# Patient Record
Sex: MALE | Race: WHITE | NOT HISPANIC OR LATINO | URBAN - METROPOLITAN AREA
[De-identification: names, ages, dates, MRNs, and addresses within clinical notes are randomized per-mention and may not be internally consistent; named-entity substitution may affect disease eponyms.]

---

## 2020-08-26 ENCOUNTER — EMERGENCY (EMERGENCY)
Facility: HOSPITAL | Age: 25
LOS: 1 days | Discharge: HOME | End: 2020-08-26
Attending: EMERGENCY MEDICINE
Payer: MEDICAID

## 2020-08-26 VITALS
DIASTOLIC BLOOD PRESSURE: 64 MMHG | SYSTOLIC BLOOD PRESSURE: 104 MMHG | HEART RATE: 74 BPM | RESPIRATION RATE: 18 BRPM | OXYGEN SATURATION: 100 % | TEMPERATURE: 99 F

## 2020-08-26 VITALS
DIASTOLIC BLOOD PRESSURE: 71 MMHG | OXYGEN SATURATION: 100 % | SYSTOLIC BLOOD PRESSURE: 119 MMHG | TEMPERATURE: 99 F | RESPIRATION RATE: 20 BRPM | HEART RATE: 94 BPM

## 2020-08-26 LAB
ALBUMIN SERPL ELPH-MCNC: 4.1 G/DL — SIGNIFICANT CHANGE UP (ref 3.3–5.2)
ALP SERPL-CCNC: 93 U/L — SIGNIFICANT CHANGE UP (ref 40–120)
ALT FLD-CCNC: 19 U/L — SIGNIFICANT CHANGE UP
ANION GAP SERPL CALC-SCNC: 10 MMOL/L — SIGNIFICANT CHANGE UP (ref 5–17)
APPEARANCE UR: ABNORMAL
AST SERPL-CCNC: 28 U/L — SIGNIFICANT CHANGE UP
BACTERIA # UR AUTO: ABNORMAL
BASOPHILS # BLD AUTO: 0.08 K/UL — SIGNIFICANT CHANGE UP (ref 0–0.2)
BASOPHILS NFR BLD AUTO: 0.9 % — SIGNIFICANT CHANGE UP (ref 0–2)
BILIRUB SERPL-MCNC: 0.3 MG/DL — LOW (ref 0.4–2)
BILIRUB UR-MCNC: NEGATIVE — SIGNIFICANT CHANGE UP
BUN SERPL-MCNC: 6 MG/DL — LOW (ref 8–20)
CALCIUM SERPL-MCNC: 9.3 MG/DL — SIGNIFICANT CHANGE UP (ref 8.6–10.2)
CHLORIDE SERPL-SCNC: 105 MMOL/L — SIGNIFICANT CHANGE UP (ref 98–107)
CO2 SERPL-SCNC: 23 MMOL/L — SIGNIFICANT CHANGE UP (ref 22–29)
COLOR SPEC: YELLOW — SIGNIFICANT CHANGE UP
COMMENT - URINE: SIGNIFICANT CHANGE UP
CREAT SERPL-MCNC: 0.86 MG/DL — SIGNIFICANT CHANGE UP (ref 0.5–1.3)
DIFF PNL FLD: NEGATIVE — SIGNIFICANT CHANGE UP
EOSINOPHIL # BLD AUTO: 0.12 K/UL — SIGNIFICANT CHANGE UP (ref 0–0.5)
EOSINOPHIL NFR BLD AUTO: 1.4 % — SIGNIFICANT CHANGE UP (ref 0–6)
EPI CELLS # UR: SIGNIFICANT CHANGE UP
ERYTHROCYTE [SEDIMENTATION RATE] IN BLOOD: 2 MM/HR — SIGNIFICANT CHANGE UP (ref 0–20)
GLUCOSE SERPL-MCNC: 84 MG/DL — SIGNIFICANT CHANGE UP (ref 70–99)
GLUCOSE UR QL: NEGATIVE MG/DL — SIGNIFICANT CHANGE UP
HCT VFR BLD CALC: 40 % — SIGNIFICANT CHANGE UP (ref 39–50)
HGB BLD-MCNC: 13.9 G/DL — SIGNIFICANT CHANGE UP (ref 13–17)
IMM GRANULOCYTES NFR BLD AUTO: 0.2 % — SIGNIFICANT CHANGE UP (ref 0–1.5)
KETONES UR-MCNC: NEGATIVE — SIGNIFICANT CHANGE UP
LEUKOCYTE ESTERASE UR-ACNC: ABNORMAL
LYMPHOCYTES # BLD AUTO: 1.55 K/UL — SIGNIFICANT CHANGE UP (ref 1–3.3)
LYMPHOCYTES # BLD AUTO: 18.3 % — SIGNIFICANT CHANGE UP (ref 13–44)
MCHC RBC-ENTMCNC: 33.6 PG — SIGNIFICANT CHANGE UP (ref 27–34)
MCHC RBC-ENTMCNC: 34.8 GM/DL — SIGNIFICANT CHANGE UP (ref 32–36)
MCV RBC AUTO: 96.6 FL — SIGNIFICANT CHANGE UP (ref 80–100)
MONOCYTES # BLD AUTO: 0.69 K/UL — SIGNIFICANT CHANGE UP (ref 0–0.9)
MONOCYTES NFR BLD AUTO: 8.1 % — SIGNIFICANT CHANGE UP (ref 2–14)
NEUTROPHILS # BLD AUTO: 6.01 K/UL — SIGNIFICANT CHANGE UP (ref 1.8–7.4)
NEUTROPHILS NFR BLD AUTO: 71.1 % — SIGNIFICANT CHANGE UP (ref 43–77)
NITRITE UR-MCNC: NEGATIVE — SIGNIFICANT CHANGE UP
PCP SPEC-MCNC: SIGNIFICANT CHANGE UP
PCP SPEC-MCNC: SIGNIFICANT CHANGE UP
PH UR: 8 — SIGNIFICANT CHANGE UP (ref 5–8)
PLATELET # BLD AUTO: 397 K/UL — SIGNIFICANT CHANGE UP (ref 150–400)
POTASSIUM SERPL-MCNC: 3.9 MMOL/L — SIGNIFICANT CHANGE UP (ref 3.5–5.3)
POTASSIUM SERPL-SCNC: 3.9 MMOL/L — SIGNIFICANT CHANGE UP (ref 3.5–5.3)
PROT SERPL-MCNC: 6.5 G/DL — LOW (ref 6.6–8.7)
PROT UR-MCNC: 15 MG/DL
RBC # BLD: 4.14 M/UL — LOW (ref 4.2–5.8)
RBC # FLD: 12.5 % — SIGNIFICANT CHANGE UP (ref 10.3–14.5)
RBC CASTS # UR COMP ASSIST: NEGATIVE /HPF — SIGNIFICANT CHANGE UP (ref 0–4)
SODIUM SERPL-SCNC: 138 MMOL/L — SIGNIFICANT CHANGE UP (ref 135–145)
SP GR SPEC: 1.01 — SIGNIFICANT CHANGE UP (ref 1.01–1.02)
UROBILINOGEN FLD QL: NEGATIVE MG/DL — SIGNIFICANT CHANGE UP
WBC # BLD: 8.47 K/UL — SIGNIFICANT CHANGE UP (ref 3.8–10.5)
WBC # FLD AUTO: 8.47 K/UL — SIGNIFICANT CHANGE UP (ref 3.8–10.5)
WBC UR QL: SIGNIFICANT CHANGE UP

## 2020-08-26 PROCEDURE — 80307 DRUG TEST PRSMV CHEM ANLYZR: CPT

## 2020-08-26 PROCEDURE — 93010 ELECTROCARDIOGRAM REPORT: CPT

## 2020-08-26 PROCEDURE — 81001 URINALYSIS AUTO W/SCOPE: CPT

## 2020-08-26 PROCEDURE — 85027 COMPLETE CBC AUTOMATED: CPT

## 2020-08-26 PROCEDURE — 96374 THER/PROPH/DIAG INJ IV PUSH: CPT | Mod: XU

## 2020-08-26 PROCEDURE — 99284 EMERGENCY DEPT VISIT MOD MDM: CPT | Mod: 25

## 2020-08-26 PROCEDURE — 93005 ELECTROCARDIOGRAM TRACING: CPT

## 2020-08-26 PROCEDURE — 70496 CT ANGIOGRAPHY HEAD: CPT

## 2020-08-26 PROCEDURE — 99244 OFF/OP CNSLTJ NEW/EST MOD 40: CPT

## 2020-08-26 PROCEDURE — G0378: CPT

## 2020-08-26 PROCEDURE — 70498 CT ANGIOGRAPHY NECK: CPT

## 2020-08-26 PROCEDURE — 36415 COLL VENOUS BLD VENIPUNCTURE: CPT

## 2020-08-26 PROCEDURE — 70551 MRI BRAIN STEM W/O DYE: CPT

## 2020-08-26 PROCEDURE — 99220: CPT

## 2020-08-26 PROCEDURE — 80053 COMPREHEN METABOLIC PANEL: CPT

## 2020-08-26 PROCEDURE — 70496 CT ANGIOGRAPHY HEAD: CPT | Mod: 26

## 2020-08-26 PROCEDURE — 70551 MRI BRAIN STEM W/O DYE: CPT | Mod: 26

## 2020-08-26 PROCEDURE — 70498 CT ANGIOGRAPHY NECK: CPT | Mod: 26

## 2020-08-26 PROCEDURE — 85652 RBC SED RATE AUTOMATED: CPT

## 2020-08-26 RX ORDER — METOCLOPRAMIDE HCL 10 MG
10 TABLET ORAL ONCE
Refills: 0 | Status: COMPLETED | OUTPATIENT
Start: 2020-08-26 | End: 2020-08-26

## 2020-08-26 RX ORDER — ACETAMINOPHEN 500 MG
975 TABLET ORAL ONCE
Refills: 0 | Status: DISCONTINUED | OUTPATIENT
Start: 2020-08-26 | End: 2020-08-31

## 2020-08-26 RX ADMIN — Medication 10 MILLIGRAM(S): at 12:30

## 2020-08-26 NOTE — ED ADULT NURSE NOTE - NSIMPLEMENTINTERV_GEN_ALL_ED
Implemented All Universal Safety Interventions:  Madeline to call system. Call bell, personal items and telephone within reach. Instruct patient to call for assistance. Room bathroom lighting operational. Non-slip footwear when patient is off stretcher. Physically safe environment: no spills, clutter or unnecessary equipment. Stretcher in lowest position, wheels locked, appropriate side rails in place.

## 2020-08-26 NOTE — ED PROVIDER NOTE - PHYSICAL EXAMINATION
Gen: Well appearing in NAD  Head: NC/AT  Neck: trachea midline  Resp:  No distress, CTAB  CV: RRR  GI: soft, NTND  Ext: no deformities  Neuro:  Diplopia with bilateral eyes, no diplopia/blurry vision with individual eyes. Motor and sensation normal. No dysmetria. Normal gait. NIHSS 0  Skin:  Warm and dry as visualized  Psych:  Normal affect and mood

## 2020-08-26 NOTE — ED CDU PROVIDER INITIAL DAY NOTE - PROGRESS NOTE DETAILS
CTA head and neck nl, no aneurysm. Spoke with Dr. Ramirez (Neuro IR) regarding results- states no need for intervention from Neuro IR standpoint.

## 2020-08-26 NOTE — CONSULT NOTE ADULT - SUBJECTIVE AND OBJECTIVE BOX
26 y/o male, PMH seizure disorder, reports breakthrough seizure on Saturday seizure witnessed by dad who heard noise in the bedroom. Admits noncompliance with seizure and headache meds. Yesterday, had an episode of double vision while at work, lasted a few hours and associated with headache, nausea, dizziness. States he went to [facility] ED and was later discharged. States 15 minutes after he returned home on Tuesday night, he had double vision again, he went to lay down in bed and it resolved. Then he woke up this am and had a third episode which lasted about an hour, his mother brought him to [facility] ED for further evaluation. Patient states that symptoms are more severe for objects that are further away. States double vision is alleviated by covering either eye. Patient states he had two episodes approximately one year ago. Patient reports he has never had an eye exam and has never worn corrective lenses.   MRI obtained in ED showed possible aneurysm adjacent to the left ophthalmic/supraclinoid ICA. Patient was placed in obs and Neurointerventional Surgery was consulted. A CTA head/neck was requested. Resulted as negative for acute findings.         PAST MEDICAL & SURGICAL HISTORY:  Headache  Seizure  No significant past surgical history    FAMILY HISTORY:  No pertinent family history in first degree relatives      SOCIAL HISTORY:  Tobacco Use: 20 pack years- states he quit last week   EtOH use: no  Substance: smoked marijuana states he quit 3 weeks ago  Allergies: none reported      REVIEW OF SYSTEMS  CONSTITUTIONAL: No fever, weight loss, or fatigue  EYES: + double vision associated w nausea/dizziness. No eye pain or discharge  ENMT:  No difficulty hearing, tinnitus, vertigo; No sinus or throat pain  NECK: No pain or stiffness  BREASTS: No pain, masses, or nipple discharge  RESPIRATORY: No cough, wheezing, chills or hemoptysis; No shortness of breath  CARDIOVASCULAR: No chest pain, palpitations, dizziness, or leg swelling  GASTROINTESTINAL: No abdominal or epigastric pain. No nausea, vomiting, or hematemesis; No diarrhea or constipation. No melena or hematochezia.  GENITOURINARY: No dysuria, frequency, hematuria, or incontinence  NEUROLOGICAL: No headaches, memory loss, loss of strength, numbness, or tremors  SKIN: No itching, burning, rashes, or lesions   LYMPH NODES: No enlarged glands  ENDOCRINE: No heat or cold intolerance; No hair loss  MUSCULOSKELETAL: No joint pain or swelling; No muscle, back, or extremity pain  PSYCHIATRIC: No depression, anxiety, mood swings, or difficulty sleeping  HEME/LYMPH: No easy bruising, or bleeding gums  ALLERY AND IMMUNOLOGIC: No hives or eczema    HOME MEDICATIONS:  Home Medications: Trileptal 600 mg bid    Neuro:  acetaminophen   Tablet .. 975 milliGRAM(s) Oral once      Vital Signs Last 24 Hrs  T(C): 37.1 (26 Aug 2020 13:25), Max: 37.1 (26 Aug 2020 13:25)  T(F): 98.7 (26 Aug 2020 13:25), Max: 98.7 (26 Aug 2020 13:25)  HR: 74 (26 Aug 2020 13:25) (74 - 94)  BP: 104/64 (26 Aug 2020 13:25) (104/64 - 131/82)  BP(mean): 99 (26 Aug 2020 12:30) (99 - 99)  RR: 18 (26 Aug 2020 13:25) (18 - 20)  SpO2: 100% (26 Aug 2020 13:25) (100% - 100%)      General: NAD, sitting up in bed, well groomed in nightgown  Eyes: PERRLA, EOM intact  Neck: Supple and symmetrical, no JVD  CV: RRR, no m/r/g  Lungs: CTA b/l, no use of accessory muscles, no wheezes, rales, rhonchi  Skin: warm, dry, no rashes  Psych: normal mood/affect  Abdomen: Normal BS, soft, NT/ND  Extremities: no edema, cyanosis  Musculoskeletal: good strength b/l UE/LE, normal ROM, no joint swelling  Neuro: A & O X 4, unable to reproduce symptoms of double vision at this time, CN intact, PERRLA, EOM intact, facial symmetry intact, clear speech, motor 5/5 all extremities, no sensory deficit       LABS:                        13.9   8.47  )-----------( 397      ( 26 Aug 2020 09:52 )             40.0 < from: CT Angio Head w/ IV Cont (20 @ 16:35) >          138  |  105  |  6.0<L>  ----------------------------<  84  3.9   |  23.0  |  0.86    Ca    9.3      26 Aug 2020 09:52    TPro  6.5<L>  /  Alb  4.1  /  TBili  0.3<L>  /  DBili  x   /  AST  28  /  ALT  19  /  AlkPhos  93        Urinalysis Basic - ( 26 Aug 2020 10:19 )    Color: Yellow / Appearance: Slightly Turbid / S.010 / pH: x  Gluc: x / Ketone: Negative  / Bili: Negative / Urobili: Negative mg/dL   Blood: x / Protein: 15 mg/dL / Nitrite: Negative   Leuk Esterase: Trace / RBC: Negative /HPF / WBC 0-2   Sq Epi: x / Non Sq Epi: Occasional / Bacteria: Few        RADIOLOGY & ADDITIONAL STUDIES:    IMPRESSION:    CT BRAIN:  No acute findings on noncontrast CT of the brain. CTA NECK:  No significant stenosis of the cervical carotid or vertebral arteries.    CTA HEAD: No high-grade stenosis or occlusion of the major proximal arterial branches.      < end of copied text >    < from: MR Head No Cont (20 @ 10:52) >    IMPRESSION:    There is no acute infarction, acute hemorrhage, cerebral edema, or intracranial mass effect.    Ovoid flow-void adjacent to the left ophthalmic/supraclinoid ICA may represent an aneurysm. Correlation with an MRA of the brain is recommended for further characterization.    < end of copied text > 24 y/o male, PMH seizure disorder, reports breakthrough seizure on Saturday seizure witnessed by dad who heard noise in the bedroom. Admits noncompliance with seizure and headache meds. Yesterday, had an episode of double vision while at work, lasted a few hours and associated with headache, nausea, dizziness. States he went to [facility] ED and was later discharged. States 15 minutes after he returned home on Tuesday night, he had double vision again, he went to lay down in bed and it resolved. Then he woke up this am and had a third episode which lasted about an hour, his mother brought him to [facility] ED for further evaluation. Patient states that symptoms are more severe for objects that are further away. States double vision is alleviated by covering either eye. Patient states he had two episodes approximately one year ago. Patient reports he has never had an eye exam and has never worn corrective lenses.   MRI obtained in ED showed possible aneurysm adjacent to the left ophthalmic/supraclinoid ICA. Patient was placed in obs and Neurointerventional Surgery was consulted. A CTA head/neck was requested. Resulted as negative for acute findings.         PAST MEDICAL & SURGICAL HISTORY:  Headache  Seizure  No significant past surgical history    FAMILY HISTORY:  No pertinent family history in first degree relatives      SOCIAL HISTORY:  Tobacco Use: 20 pack years- states he quit last week   EtOH use: no  Substance: smoked marijuana states he quit 3 weeks ago  Allergies: none reported      REVIEW OF SYSTEMS  CONSTITUTIONAL: No fever, weight loss, or fatigue  EYES: + double vision associated w nausea/dizziness. No eye pain or discharge  ENMT:  No difficulty hearing, tinnitus, vertigo; No sinus or throat pain  NECK: No pain or stiffness  BREASTS: No pain, masses, or nipple discharge  RESPIRATORY: No cough, wheezing, chills or hemoptysis; No shortness of breath  CARDIOVASCULAR: No chest pain, palpitations, dizziness, or leg swelling  GASTROINTESTINAL: No abdominal or epigastric pain. No nausea, vomiting, or hematemesis; No diarrhea or constipation. No melena or hematochezia.  GENITOURINARY: No dysuria, frequency, hematuria, or incontinence  NEUROLOGICAL: No headaches, memory loss, loss of strength, numbness, or tremors  SKIN: No itching, burning, rashes, or lesions   LYMPH NODES: No enlarged glands  ENDOCRINE: No heat or cold intolerance; No hair loss  MUSCULOSKELETAL: No joint pain or swelling; No muscle, back, or extremity pain  PSYCHIATRIC: No depression, anxiety, mood swings, or difficulty sleeping  HEME/LYMPH: No easy bruising, or bleeding gums  ALLERY AND IMMUNOLOGIC: No hives or eczema    HOME MEDICATIONS:  Home Medications: Trileptal 600 mg bid    Neuro:  acetaminophen   Tablet .. 975 milliGRAM(s) Oral once      Vital Signs Last 24 Hrs  T(C): 37.1 (26 Aug 2020 13:25), Max: 37.1 (26 Aug 2020 13:25)  T(F): 98.7 (26 Aug 2020 13:25), Max: 98.7 (26 Aug 2020 13:25)  HR: 74 (26 Aug 2020 13:25) (74 - 94)  BP: 104/64 (26 Aug 2020 13:25) (104/64 - 131/82)  BP(mean): 99 (26 Aug 2020 12:30) (99 - 99)  RR: 18 (26 Aug 2020 13:25) (18 - 20)  SpO2: 100% (26 Aug 2020 13:25) (100% - 100%)      General: NAD, sitting up in bed, well groomed in nightgown  Eyes: PERRLA, EOM intact  Neck: Supple and symmetrical, no JVD  CV: RRR, no m/r/g  Lungs: CTA b/l, no use of accessory muscles, no wheezes, rales, rhonchi  Skin: warm, dry, no rashes  Psych: normal mood/affect  Abdomen: Normal BS, soft, NT/ND  Extremities: no edema, cyanosis  Musculoskeletal: good strength b/l UE/LE, normal ROM, no joint swelling  Neuro: A & O X 4, unable to reproduce symptoms of double vision at this time, CN intact, PERRLA, EOM intact, facial symmetry intact, clear speech, motor 5/5 all extremities, no sensory deficit       LABS:                        13.9   8.47  )-----------( 397      ( 26 Aug 2020 09:52 )             40.0   08-26    138  |  105  |  6.0<L>  ----------------------------<  84  3.9   |  23.0  |  0.86    Ca    9.3      26 Aug 2020 09:52    TPro  6.5<L>  /  Alb  4.1  /  TBili  0.3<L>  /  DBili  x   /  AST  28  /  ALT  19  /  AlkPhos  93        Urinalysis Basic - ( 26 Aug 2020 10:19 )    Color: Yellow / Appearance: Slightly Turbid / S.010 / pH: x  Gluc: x / Ketone: Negative  / Bili: Negative / Urobili: Negative mg/dL   Blood: x / Protein: 15 mg/dL / Nitrite: Negative   Leuk Esterase: Trace / RBC: Negative /HPF / WBC 0-2   Sq Epi: x / Non Sq Epi: Occasional / Bacteria: Few        RADIOLOGY & ADDITIONAL STUDIES:    CT Angio Head w/ IV Cont (20 @ 16:35)       IMPRESSION:    CT BRAIN:  No acute findings on noncontrast CT of the brain. CTA NECK:  No significant stenosis of the cervical carotid or vertebral arteries.    CTA HEAD: No high-grade stenosis or occlusion of the major proximal arterial branches.      MR Head No Cont (20 @ 10:52)     IMPRESSION:    There is no acute infarction, acute hemorrhage, cerebral edema, or intracranial mass effect.    Ovoid flow-void adjacent to the left ophthalmic/supraclinoid ICA may represent an aneurysm. Correlation with an MRA of the brain is recommended for further characterization.

## 2020-08-26 NOTE — CONSULT NOTE ADULT - ATTENDING COMMENTS
Impression: Intermittent episodes of diplopia as described seemed central in origin in view of negative MR TIA is among differential diagnosis, needs follow up by Neurologist. However no vascular risk factors other than current smoker. Needs thyroid disease work up, autoimmune conditions like myasthenia all which can be completed as outpatient.

## 2020-08-26 NOTE — ED CDU PROVIDER INITIAL DAY NOTE - MEDICAL DECISION MAKING DETAILS
episodic diplopia with acute on chronic headache and abnormal MRI.  Case d/w Dr Ramirez (neuro IR) and CT angio head and neck ordered.

## 2020-08-26 NOTE — ED CDU PROVIDER DISPOSITION NOTE - CARE PROVIDER_API CALL
Lupe Larry  OPHTHALMOLOGY  [address] [place] [address], [address]  [place] [address]  Phone: [phone]  Fax: [phone]  Follow Up Time:     Brian Brooks  NEUROLOGY  [address] [place] [address]  Phone: [phone]  Fax: [phone]  Follow Up Time:

## 2020-08-26 NOTE — ED CDU PROVIDER INITIAL DAY NOTE - OBJECTIVE STATEMENT
reports breakthrough seizure on saturday; seizure witnessed by dad who heard noise in the bedroom.  Admits noncompliance with seizure and headache meds.  On monday, had an episode of double vision while at work with dilated left pupil: lasted for hours and went away.  Reports episode since.  also with headache.  denies fever, denies neck stiffness.  head feels "foggy".  Not sure of name of neurologist

## 2020-08-26 NOTE — ED ADULT NURSE REASSESSMENT NOTE - NS ED NURSE REASSESS COMMENT FT1
Pt care endorsed to accepting OBS unit RN, POC discussed with pt who verbalize understanding and agree, pt safely transferred to OBS unit at this time, pt left unit A&Ox4, offered no complaints, VSS, safety maintained.
Pt just returned to unit from test, pt offers no complaints at this time, reports symptoms as subsided, pt awaiting test results, verbalizes understanding and agree with plan, safety and comfort maintained.
Transport at pt bedside at this time, pt in transport to MRI, left unit A&Ox4, offered no complaints, denies diplopia at this time, safety and comfort maintained.
Patient received from ED RN Riley.   Patient placed in CDU 9  Awaiting CTA  No diet ordered at this time.   Plan of care discussed with patient.   Siderails upx2, call bell within reach.

## 2020-08-26 NOTE — ED PROVIDER NOTE - PROGRESS NOTE DETAILS
Thor HUTTON: On reevaluation, pt reports resolution of diplopia, but continues to complain of generalized headache, described as feeling "fluffy."  States that he has had headache for the past 3 days.  Pt notes that he does not currently see a neurologist.  Will treat with reglan.  MRI results reviewed.  Will place in observation for MRA.

## 2020-08-26 NOTE — ED CDU PROVIDER INITIAL DAY NOTE - ATTENDING CONTRIBUTION TO CARE
I, Jian Fuentes, performed the initial face to face bedside interview with this patient regarding history of present illness, review of symptoms and relevant past medical, social and family history.  I completed an independent physical examination.  I was the provider who initially evaluated this patient.  Follow-up on ordered tests (ie labs, radiologic studies) and re-evaluation of the patient's status has been communicated to the ACP.  Disposition of the patient will be based on test outcome and response to ED interventions.

## 2020-08-26 NOTE — ED PROVIDER NOTE - ATTENDING CONTRIBUTION TO CARE
Johann: I performed a face to face bedside interview with patient regarding history of present illness, review of symptoms and past medical history. I completed an independent physical exam and ordered tests/medications as needed.  I have discussed patient's plan of care with the resident. The resident assisted in  executing the discussed plan. I was available for any questions or issues that may have arose during the execution of the plan of care.

## 2020-08-26 NOTE — ED ADULT NURSE NOTE - NS ED NURSE DC INFO COMPLEXITY
Verbalized Understanding/Returned Demonstration/Patient asked questions/Straightforward: Basic instructions, no meds, no home treatment/Simple: Patient demonstrates quick and easy understanding

## 2020-08-26 NOTE — ED CDU PROVIDER DISPOSITION NOTE - ATTENDING CONTRIBUTION TO CARE
Pt. with binocular diplopia that has resolved. Negative work up. Pt. will need to follow up with neurology and ophthalmology. I, Dr. Romero, performed a face to face bedside interview with this patient regarding history of present illness, review of symptoms and relevant past medical, social and family history.  I completed an independent physical examination.  I have also reviewed the ACP's note(s) and discussed the plan with the ACP.

## 2020-08-26 NOTE — ED CDU PROVIDER DISPOSITION NOTE - PATIENT PORTAL LINK FT
You can access the FollowMyHealth Patient Portal offered by [facility] by registering at the following website: [email or web address]. By joining [employer]’s FollowMyHealth portal, you will also be able to view your health information using other applications (apps) compatible with our system.

## 2020-08-26 NOTE — ED ADULT NURSE NOTE - CHIEF COMPLAINT QUOTE
Pt c/o double vision and loss of balance with headaches that started 7am yesterday. Pt stated he went to [facility] yesterday and they did an MRI and told him nothing. Pt also reports having seizure on Saturday after self-discontinuing his seizure medication 1 month ago.

## 2020-08-26 NOTE — ED ADULT TRIAGE NOTE - CHIEF COMPLAINT QUOTE
Pt c/o double vision and loss of balance with headaches that started 7am yesterday. Pt stated he went to [facility] yesterday and they did an MRI and told him nothing. Pt also reports having seizure on Saturday after self-discontinuing his seizure medication 1 month ago.

## 2020-08-26 NOTE — CONSULT NOTE ADULT - ASSESSMENT
24 y/o male, [facility] seizure disorder, reports breakthrough seizure on Saturday seizure witnessed by dad who heard noise in the bedroom. Admits noncompliance with seizure and headache meds. Yesterday, had an episode of double vision while at work, lasted a few hours and associated with headache, nausea, dizziness. States he went to [facility] ED and was later discharged. States 15 minutes after he returned home on Tuesday night, he had double vision again, he went to lay down in bed and it resolved. Then he woke up this am and had a third episode which lasted about an hour, his mother brought him to [facility] ED for further evaluation. Patient states that symptoms are more severe for objects that are further away. States double vision is alleviated by covering either eye. Patient states he had two episodes approximately one year ago. Patient reports he has never had an eye exam and has never worn corrective lenses.   MRI obtained in ED showed possible aneurysm adjacent to the left ophthalmic/supraclinoid ICA. Patient was placed in obs and Neurointerventional Surgery was consulted. A CTA head/neck was requested. Resulted as negative for acute findings.       --> Discussed with Dr Ramirez, CTA reviewed, no evidence of aneurysm, LVO, AVM or any acute findings, patient can be discharged and is advised to follow up with ophthalmologist as outpatient.   --> Patient follows with Dr Suman Newby neurologist in [place] for seizure disorder that he has had since childhood. Reports he takes Trileptal 600 mg bid. Advised patient on importance of outpatient follow up and medication compliance.   --> Patient is asymptomatic and neurologically intact. Patient is stable from neuro intervention standpoint for discharge to home. Should follow up with his neurologist as outlined above.

## 2020-08-26 NOTE — ED CDU PROVIDER DISPOSITION NOTE - CLINICAL COURSE
25M h/o seizures (supposed to be on trileptal 600mg bid, topiramate 50mg bid) p/w 2 episodes of diplopia which occurred both times while the pt was at work. Had a GTC seizure in his sleep 4 days ago, witnessed by father. Patient has been non-compliant with anti-epileptics, started taking them again 3 days ago. Woke up yesterday morning with diplopia, went to [facility] and had CTH that was negative. Diplopia again today lasted for a few hours associated with a HA and then resolved on its own. MR head reveals possible opthalmic aneurysm. CTA head and neck negative for stenosis or aneurysm. Discussed results with neuro IR, Dr. Ramirez who reports no neuro IR intervention warranted. Reassessed pt reports his sx have completely resolved, and pt is back to his baseline. Will d/c with neurology and optho for follow up.

## 2020-08-26 NOTE — ED ADULT NURSE NOTE - OBJECTIVE STATEMENT
Pt c/o double vision and loss of balance with headaches that started 7am yesterday. Pt stated he went to [facility] yesterday and they did an MRI and told him nothing. Pt also reports having seizure on Saturday after self-discontinuing his seizure medication 1 month ago.

## 2020-08-26 NOTE — ED STATDOCS - CLINICAL SUMMARY MEDICAL DECISION MAKING FREE TEXT BOX
26 y/o M pt with no significant PMHx presents to the ED c/o intermittent double vision that began yesterday morning after he got to work. It happened once before last year and was told that it is related to his SZ. He went to [facility] yesterday and received a CT. Pt has been having SZ since he was 6. Mother states that he gets double vision when not taking his medications (Trileptal 600mg BID and Topiramate 50mg BID). Reports HA on the front and side of his head. He had a SZ 4 days ago; shaking and foaming at the mouth for 5 minutes; no incontinence, no biting of the tongue. Smokes a couple cigarettes a day, stopped smoking marijuana 3 weeks ago, and quit EtOH 3 weeks ago after drinking heavily each day. No n/v, numbness, tingling. Denies FHx of neurological conditions and other significant medical conditions. No further complaints at this time. Will send to [facility] for further evaluation. 25 y/oM; with PMHx signif for Seizures; now presents to the ED c/o headahe and diplopia--intermittent, began yesterday morning after he got to work. States this diplopia occurred once before last year and was told that it is related to his SZ. Yesterday when he awoke from sleep, he awoke with diplopia, went to work, but could not continue.  Patient went to [facility] yesterday and received a CT, but does not recall the results. today, began having diplopia again, associated with headache--frontal, non-radiating, denies associated numbness/tingling. denies focal weakness. denies n/v.  Reports having seizure 4 days ago; tonic-clonic movements for 5 minutes; denies incontinence, denies tongue bite. denies post-ictal period. Pt has been having seizure since he was 6, but mother states that he gets headaches when non-compliant with medications (Trileptal 600mg BID and Topiramate 50mg BID).   NIHSS = 0, no diplopia/blurry vision with individual eye exam, but diplopia when both eyes open.    PMH: Seizures  SOCIAL: +smoker: Smokes a couple cigarettes a day, stopped smoking marijuana 3 weeks ago, and stopped EtOH 3 weeks ago prior to which he was drinkgin 4 'tall boys' daily.   FAMILY HX: denies hx of any neurological disorders.  Focused eval, protocol orders entered. Pt to be moved to main ED for complete evaluation by another provider.

## 2020-12-05 ENCOUNTER — TRANSCRIPTION ENCOUNTER (OUTPATIENT)
Age: 25
End: 2020-12-05

## 2022-06-28 PROBLEM — R51 HEADACHE: Chronic | Status: ACTIVE | Noted: 2020-08-26

## 2022-06-28 PROBLEM — R56.9 UNSPECIFIED CONVULSIONS: Chronic | Status: ACTIVE | Noted: 2020-08-26

## 2022-08-02 ENCOUNTER — APPOINTMENT (OUTPATIENT)
Dept: ORTHOPEDIC SURGERY | Facility: CLINIC | Age: 27
End: 2022-08-02

## 2022-08-02 DIAGNOSIS — R56.9 UNSPECIFIED CONVULSIONS: ICD-10-CM

## 2022-08-02 PROBLEM — Z00.00 ENCOUNTER FOR PREVENTIVE HEALTH EXAMINATION: Status: ACTIVE | Noted: 2022-08-02

## 2022-08-02 PROCEDURE — 99204 OFFICE O/P NEW MOD 45 MIN: CPT

## 2022-08-02 RX ORDER — NAPROXEN 500 MG/1
500 TABLET ORAL
Qty: 60 | Refills: 0 | Status: ACTIVE | COMMUNITY
Start: 2022-08-02 | End: 1900-01-01

## 2022-08-02 NOTE — HISTORY OF PRESENT ILLNESS
[Neck] : neck [Upper back] : upper back [10] : 10 [Sharp] : sharp [Shooting] : shooting [] : yes [de-identified] : 08/02/2022 - 27 year old RHD male presents for C/T/L spine pain X a few months. Denies specific injury. Denies pain/N/T in b/l UEs/LEs . Neck pain aggravated by looking over both shoulders.  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  Alleviated temporarily with biofreeze. Denies prior neck/back surgeries/physical therapy. Tried chiro X 2 visits, which worsened the pain. \par  [FreeTextEntry5] : no reported injury, pain started a few months ago. [de-identified] : X-ray

## 2022-08-02 NOTE — IMAGING
[Outside films reviewed] : Outside films reviewed [Straightening consistent with spasm] : Straightening consistent with spasm [de-identified] : C spine\par \par Inspection: No rash or ecchymosis \par \par Palpation: L paracervical tenderness\par \par ROM: diminished all planes\par \par Strength: 5/5 bilateral deltoid, biceps, triceps, wrist flexors, wrist extensors, , abductors \par \par Sensation: Sensation present to light touch bilateral C5-T1 distributions \par \par \par \par \par L spine\par \par Inspection: No rash or ecchymosis \par \par Palpation: Midline lumbar tenderness. No midline thoracic tenderness. No tenderness to palpation or spasm in bilateral thoracic and lumbar paraspinal musculature. No SI joint tenderness to palpation. No greater trochanter tenderness to palpation.\par \par ROM: Full with stiffness. Pain with forward flexion\par \par Strength: 5/5 bilateral hip flexors, knee extensors, ankle dorsiflexors, EHL, ankle plantarflexors \par \par Sensation: Sensation present to light touch bilateral L2-S1 distributions \par \par Provocative maneuvers: Negative bilateral straight leg raise\par

## 2022-08-02 NOTE — ASSESSMENT
[FreeTextEntry1] : PT, meds\par follow up 6 weeks\par \par \par NSAIDs- Patient warned of risk of medication to GI tract, increased blood pressure, cardiac risk, and risk of fluid retention.  Advised to clear medication with internist or PCP if any concurrent health problem with heart, blood pressure, or GI system exists.\par

## 2022-09-13 ENCOUNTER — APPOINTMENT (OUTPATIENT)
Dept: ORTHOPEDIC SURGERY | Facility: CLINIC | Age: 27
End: 2022-09-13

## 2022-11-29 ENCOUNTER — APPOINTMENT (OUTPATIENT)
Dept: ORTHOPEDIC SURGERY | Facility: CLINIC | Age: 27
End: 2022-11-29

## 2022-11-29 PROCEDURE — 99214 OFFICE O/P EST MOD 30 MIN: CPT

## 2022-11-29 RX ORDER — IBUPROFEN 800 MG/1
800 TABLET, FILM COATED ORAL
Qty: 60 | Refills: 0 | Status: ACTIVE | COMMUNITY
Start: 2022-11-29 | End: 1900-01-01

## 2022-11-29 RX ORDER — DICLOFENAC SODIUM 1% 10 MG/G
1 GEL TOPICAL
Qty: 100 | Refills: 2 | Status: ACTIVE | COMMUNITY
Start: 2022-11-29 | End: 1900-01-01

## 2022-11-29 RX ORDER — LIDOCAINE 40 MG/G
4 PATCH TOPICAL
Qty: 30 | Refills: 2 | Status: ACTIVE | COMMUNITY
Start: 2022-11-29 | End: 1900-01-01

## 2022-11-29 NOTE — ASSESSMENT
[FreeTextEntry1] : PT, meds\par \par \par NSAIDs- Patient warned of risk of medication to GI tract, increased blood pressure, cardiac risk, and risk of fluid retention.  Advised to clear medication with internist or PCP if any concurrent health problem with heart, blood pressure, or GI system exists.\par \par Patient has failed 6 weeks of conservative care, including physical therapy and naproxen. Will obtain cervical and lumbar MRI to rule out HNP; will also be used to guide potential future injections/surgical management.\par \par follow up after MRI\par \par Patient seen by Maegan Rodriguez PA-C, under the supervision of Dr. Parmjit Daniel M.D.\par

## 2022-11-29 NOTE — IMAGING
[Outside films reviewed] : Outside films reviewed [Straightening consistent with spasm] : Straightening consistent with spasm [de-identified] : C spine\par \par Inspection: No rash or ecchymosis \par \par Palpation: no midline/paracervical/trapezial  tenderness\par \par ROM: diminished all planes\par \par Strength: 5/5 bilateral deltoid, biceps, triceps, wrist flexors, wrist extensors, , abductors \par \par Sensation: Sensation present to light touch bilateral C5-T1 distributions \par \par \par \par \par L spine\par \par Inspection: No rash or ecchymosis \par \par Palpation: No midline lumbar tenderness. No midline thoracic tenderness. No tenderness to palpation or spasm in bilateral thoracic and lumbar paraspinal musculature. No SI joint tenderness to palpation. No greater trochanter tenderness to palpation.\par \par ROM: Full with stiffness. Pain with forward flexion\par \par Strength: 5/5 bilateral hip flexors, knee extensors, ankle dorsiflexors, EHL, ankle plantarflexors \par \par Sensation: Sensation present to light touch bilateral L2-S1 distributions \par \par Provocative maneuvers: Negative bilateral straight leg raise\par

## 2022-11-29 NOTE — HISTORY OF PRESENT ILLNESS
[Neck] : neck [Upper back] : upper back [10] : 10 [Sharp] : sharp [Shooting] : shooting [] : yes [de-identified] : 08/02/2022 - 27 year old RHD male presents for C/T/L spine pain X a few months. Denies specific injury. Denies pain/N/T in b/l UEs/LEs . Neck pain aggravated by looking over both shoulders.  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  Alleviated temporarily with biofreeze. Denies prior neck/back surgeries/physical therapy. Tried chiro X 2 visits, which worsened the pain. \par \par 11/29/22-  Has been going to PT, without relief. Denies pain/N/T in BUEs/BLEs. \par  [FreeTextEntry5] : no reported injury, pain started a few months ago. [de-identified] : X-ray  [de-identified] : PT

## 2022-12-08 ENCOUNTER — RESULT REVIEW (OUTPATIENT)
Age: 27
End: 2022-12-08

## 2022-12-12 ENCOUNTER — RESULT REVIEW (OUTPATIENT)
Age: 27
End: 2022-12-12

## 2022-12-28 ENCOUNTER — APPOINTMENT (OUTPATIENT)
Dept: ORTHOPEDIC SURGERY | Facility: CLINIC | Age: 27
End: 2022-12-28

## 2022-12-28 VITALS — BODY MASS INDEX: 21.87 KG/M2 | WEIGHT: 165 LBS | HEIGHT: 73 IN

## 2022-12-28 DIAGNOSIS — M51.26 OTHER INTERVERTEBRAL DISC DISPLACEMENT, LUMBAR REGION: ICD-10-CM

## 2022-12-28 DIAGNOSIS — M62.830 MUSCLE SPASM OF BACK: ICD-10-CM

## 2022-12-28 DIAGNOSIS — M62.838 OTHER MUSCLE SPASM: ICD-10-CM

## 2022-12-28 PROCEDURE — 99215 OFFICE O/P EST HI 40 MIN: CPT

## 2022-12-28 NOTE — HISTORY OF PRESENT ILLNESS
[Neck] : neck [Upper back] : upper back [Lower back] : lower back [8] : 8 [Sharp] : sharp [Shooting] : shooting [] : yes [de-identified] : C spine MRI 12/12/22 [facility]:\par C2-C3: No disc herniation, central canal, or foraminal stenosis.\par C3-C4: No disc herniation, central canal, or foraminal stenosis.\par C4-C5: There is a minimal broad-based posterior disc osteophyte complex. There\par is right neural foraminal narrowing. There is spinal canal stenosis measuring 9\par mm in AP dimension.\par C5-C6: There is a 3 mm broad-based posterior disc osteophyte complex. There is\par bilateral neural foraminal narrowing. There is spinal canal stenosis measuring 9\par mm in AP dimension.\par C6-C7: There is a 3 mm broad-based right subarticular zone disc osteophyte\par complex. There is right neural foraminal narrowing. There is spinal canal\par stenosis measuring 9 mm in AP.\par C7-T1: No disc herniation, central canal, or foraminal stenosis.\par Ind. review- \par Mild R NF narrowing C4/5;\par R NF HNP C6/7\par \par L spine MRI 12/8/22-\par The L5-S1 level shows a left foraminal disc herniation projecting into the left\par L5-S1 foramen. No central spinal stenosis. Right L5-S1 foramen is patent.\par L4-5 level shows a central disc herniation indenting the anterior aspect of\par thecal sac. The neural foramina patent.\par The L3-4, L2-3, L1-2 and T12-L1 levels show no evidence of disc herniation or\par spinal stenosis. The neural foramina patent.\par Conus and cauda equina are normal.\par Paravertebral soft tissue normal.\par No fracture or dislocation.\par Ind. review- \par L4/5 central HNP with b/l LR narrowing; \par L NF HNP L5/S1\par ___________________\par \par \par 08/02/2022 - 27 year old RHD male presents for C/T/L spine pain X a few months. Denies specific injury. Denies pain/N/T in b/l UEs/LEs . Neck pain aggravated by looking over both shoulders.  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  Alleviated temporarily with biofreeze. Denies prior neck/back surgeries/physical therapy. Tried chiro X 2 visits, which worsened the pain. \par \par 11/29/22-  Has been going to PT, without relief. Denies pain/N/T in BUEs/BLEs. \par \par 12/28/22- MRI follow up. Pain is central in neck, midline. Radiates across scapulae. Also LBP L and R w/o pain distallys\par  [FreeTextEntry5] : Pt is here for MRI review of C spine and L spine. Pt states pain is constant and is interrupting sleep. [de-identified] : X-ray  [de-identified] : PT

## 2022-12-28 NOTE — IMAGING
[de-identified] : C spine\par \par Inspection: No rash or ecchymosis \par \par Palpation: no midline/paracervical/trapezial  tenderness\par \par ROM: diminished all planes\par \par Strength: 5/5 bilateral deltoid, biceps, triceps, wrist flexors, wrist extensors, , abductors \par \par Sensation: Sensation present to light touch bilateral C5-T1 distributions \par \par \par \par \par L spine\par \par Inspection: No rash or ecchymosis \par \par Palpation: No midline lumbar tenderness. No midline thoracic tenderness. No tenderness to palpation or spasm in bilateral thoracic and lumbar paraspinal musculature. No SI joint tenderness to palpation. No greater trochanter tenderness to palpation.\par \par ROM: Full with stiffness. Pain with forward flexion\par \par Strength: 5/5 bilateral hip flexors, knee extensors, ankle dorsiflexors, EHL, ankle plantarflexors \par \par Sensation: Sensation present to light touch bilateral L2-S1 distributions \par \par Provocative maneuvers: Negative bilateral straight leg raise\par

## 2022-12-28 NOTE — ASSESSMENT
[FreeTextEntry1] : PT, meds\par \par \par NSAIDs- Patient warned of risk of medication to GI tract, increased blood pressure, cardiac risk, and risk of fluid retention.  Advised to clear medication with internist or PCP if any concurrent health problem with heart, blood pressure, or GI system exists.\par \par Pain c/s\par \par

## 2023-02-06 ENCOUNTER — APPOINTMENT (OUTPATIENT)
Dept: PAIN MANAGEMENT | Facility: CLINIC | Age: 28
End: 2023-02-06
Payer: COMMERCIAL

## 2023-02-06 VITALS — HEIGHT: 73 IN | WEIGHT: 165 LBS | BODY MASS INDEX: 21.87 KG/M2

## 2023-02-06 DIAGNOSIS — M54.12 RADICULOPATHY, CERVICAL REGION: ICD-10-CM

## 2023-02-06 PROCEDURE — J3490M: CUSTOM

## 2023-02-06 PROCEDURE — 99244 OFF/OP CNSLTJ NEW/EST MOD 40: CPT | Mod: 25

## 2023-02-06 PROCEDURE — 20552 NJX 1/MLT TRIGGER POINT 1/2: CPT

## 2023-02-06 NOTE — HISTORY OF PRESENT ILLNESS
[Neck] : neck [Lower back] : lower back [6] : 6 [4] : 4 [Radiating] : radiating [Constant] : constant [Work] : work [Rest] : rest [de-identified] : pt states he is having pain in the neck , and back  [] : no [de-identified] : depends on activities

## 2023-02-06 NOTE — PROCEDURE
[FreeTextEntry3] : Trigger Point was performed because of pain inflammation Anesthesia: ethyl chloride sprayed topically.: Lidocaine .1% 2 cc Marcaine:.25% 2cc  kenalog 10mg/cc 2cc :Needle size: 25 gauge 1 1/2inch.  Medication was injected in the right and left Lumbar paraspinal muscles . Patient has tried OTC's including aspirin, Ibuprofen, Aleve etc or prescription NSAIDS, and/or exercises at home and/ or physical therapy without satisfactory response After verbal consent using sterile preparation and technique.The risks, benefits, and alternatives to cortisone injection were explained in full to the patient. Risks outlined include but are not limited to infection, sepsis, bleeding, scarring, skin discoloration, temporary increase in pain, syncopal episode, failure to resolve symptoms, allergic reaction, symptom recurrence, and elevation of blood sugar in diabetics. Patient understood the risks. All questions were answered. After discussion of options, patient requested an injection. Oral informed consent was obtained and sterile prep was done of the injection site. Sterile technique was utilized for the procedure including the preparation of the solutions used for the injection. Patient tolerated the procedure well. Advised to ice the injection site this evening.  Sterile technique used Prep with alcohol locally to site.\par Trigger Point was performed because of pain inflammation Anesthesia: ethyl chloride sprayed topically.: Lidocaine .1% 2 cc Marcaine:.25% 2cc  kenalog 10mg/cc 2cc :Needle size: 25 gauge 1 1/2inch.  Medication was injected in the right and left trapezius muscles  . Patient has tried OTC's including aspirin, Ibuprofen, Aleve etc or prescription NSAIDS, and/or exercises at home and/ or physical therapy without satisfactory response After verbal consent using sterile preparation and technique.The risks, benefits, and alternatives to cortisone injection were explained in full to the patient. Risks outlined include but are not limited to infection, sepsis, bleeding, scarring, skin discoloration, temporary increase in pain, syncopal episode, failure to resolve symptoms, allergic reaction, symptom recurrence, and elevation of blood sugar in diabetics. Patient understood the risks. All questions were answered. After discussion of options, patient requested an injection. Oral informed consent was obtained and sterile prep was done of the injection site. Sterile technique was utilized for the procedure including the preparation of the solutions used for the injection. Patient tolerated the procedure well. Advised to ice the injection site this evening.  Sterile technique used Prep with alcohol locally to site.\par \par

## 2023-02-06 NOTE — DISCUSSION/SUMMARY
[de-identified] : bl cervical radicular pain\par no relief with nsaids or pt\par I personally reviewed the MRI/CT scan images and agree with the radiologist's report. The radiological findings were discussed with the patient\par proceed with celi

## 2023-02-06 NOTE — PHYSICAL EXAM
[de-identified] : PHYSICAL EXAM\par \par Constitutional: \par Appears well, no apparent distress\par Ability to communicate: Normal\par Respiratory: non-labored breathing\par Skin: no rash noted\par Head: normocephalic, atraumatic\par Neck: no visible thyroid enlargement\par Eyes: extraocular movements intact\par Neurologic: alert and oriented x3\par Psychiatric: normal mood, affect, and behavior\par \par Cervical:\par Palpation: bilateral trapezial spasm, bilateral trapezius tenderness and bilateral paracervical tenderness. ttpober bl lumbar and thoracic paraspinal muscles\par ROM: Diminished range of motion in all plains.  Patient notes pain at extremes of extension and rotation bilaterally.  Stiffness at extremes of extension and rotation bilaterally\par MMT: Motor exam is 5/5 through out bilateral upper extremities.\par Sensation: Light touch and pain is intact throughout bilateral upper extremities.\par Reflexes: No sustained clonus.\par Special Testing: Positive bilateral Spurling test \par \par Assessemnt:\par Radiculopathy of cervical region (M54.12)\par Cervicalgia (M54.2)\par \par \par Plan:\par After discussing various treatment options with the patient including but not limited to oral medications, physical therapy, exercise modalities as well as interventional spinal injections, we have decided with the following plan:\par \par MRI Review \par I personally reviewed the MRI/CT scan images and agree with the radiologist's report.  The radiological findings were discussed with the patient. \par  \par \par .\par \par The risks, benefits and alternatives of the proposed procedure were explained in detail with the patient.  The risks outlined include but are not limited to infection, bleeding, post dural puncture headache, nerve injury, a temporary increase in pain, failure to resolve symptoms, allergic reaction, symptom recurrence, and possible elevation of blood sugar.  All questions were answered to patient's satisfaction and he/she verbalized an understanding.\par \par Follow up 1-2 weeks post injection for re-evaluation.\par \par Continue home exercises, stretching, activity modification, physical therapy, and conservative care.\par \par \par \par \par

## 2023-07-19 ENCOUNTER — APPOINTMENT (OUTPATIENT)
Dept: PAIN MANAGEMENT | Facility: CLINIC | Age: 28
End: 2023-07-19
Payer: COMMERCIAL

## 2023-07-19 PROCEDURE — 62321 NJX INTERLAMINAR CRV/THRC: CPT

## 2023-07-19 NOTE — PROCEDURE
[FreeTextEntry3] : Date of Service: 07/19/2023 \par \par Account: [id number]\par \par Patient: XOCHITL BAUM \par \par YOB: 1995\par \par Age: 27 year\par \par \par Surgeon: Dav Ho M.D.\par \par Pre-Operative Diagnosis: Cervical Radiculopathy\par \par Post Operative Diagnosis: Cervical Radiculopathy\par \par Procedure: Interlaminar cervical epidural steroid injection (C7-T1) under fluoroscopic guidance \par \par Anesthesia: MAC\par \par \par This procedure was carried out using fluoroscopic guidance.  The risks and benefits of the procedure were discussed extensively with the patient.  The consent of the patient was obtained and the following procedure was performed.\par \par The patient was placed in the prone position using a thoracic and chin support.  The cervical area was prepped and draped in a sterile fashion.  The fluoroscope visualized the C7-T1 interspace using slight cephalad-caudad angulation and this area was marked.  Using sterile technique the superficial skin was anesthetized with 1% Lidocaine without epinephrine.  A 18 gauge Tuohoy needle was advanced under fluoroscopy using [id number] technique until ligament was engaged.  The stilette was then removed and a column of preservative free normal saline flushed through the tuohoy needle and left with a concave fluid level above the butterfly portion of the tuohoy needle.  The needle was then advanced under fluoroscopic guidance until the column of saline disappeared.  Lateral view confirmed final needle tip placement in the epidural space.  After negative aspiration for heme and CSF, 1 cc of omnipaque confirmed good cervical epiduragram.  \par \par Cervical epidurogram showed no evidence of intrathecal or intravascular flow, and good bilateral epidural flow from C3 to T2 levels.  \par \par An injectate of 3cc of preservative free normal saline plus 12 mg of betamethasone was then injected into the epidural space. The needle was subsequently removed and pressure was applied.\par \par Anesthesia personnel were present throughout the procedure.  The patient tolerated the procedure well and was instructed to contact me immediately if there were any problems.\par \par \par Dav Ho M.D.\par

## 2023-08-07 ENCOUNTER — APPOINTMENT (OUTPATIENT)
Dept: PAIN MANAGEMENT | Facility: CLINIC | Age: 28
End: 2023-08-07
Payer: COMMERCIAL

## 2023-08-07 VITALS — HEIGHT: 73 IN | WEIGHT: 165 LBS | BODY MASS INDEX: 21.87 KG/M2

## 2023-08-07 PROCEDURE — 99214 OFFICE O/P EST MOD 30 MIN: CPT

## 2023-08-07 NOTE — DISCUSSION/SUMMARY
[Surgical risks reviewed] : Surgical risks reviewed [de-identified] : PROCEED CERVICAL MBB C4-7   After discussing various treatment options with the patient including but not limited to oral medications, physical therapy, exercise, modalities as well as interventional spinal injections, we have decided with the following plan  I personally reviewed the MRI/CT scan images and agree with the radiologist's report. The radiological findings were discussed with the patient.   The risks, benefits, contents and alternatives to injection were explained in full to the patient. Risks outlined include but are not limited to infection,sepsis, bleeding, post-dural puncture headache, nerve damage, temporary increase in pain, syncopal episode, failure to resolve symptoms, allergic reaction, symptom recurrence, and elevation of blood sugar in diabetics. Cortisone may cause immunosuppression. Patient understands the risks. All questions were answered. After discussion of options, patient requested an injection. Information regarding the injection was given to the patient. Which medications to stop prior to the injection was explained to the patient as well.  Follow up in 1-2 weeks post injection for re-evaluation.   Conservative Care Continue Home exercises, stretching, activity modification, physical therapy, and conservative care.

## 2023-08-07 NOTE — PHYSICAL EXAM
[de-identified] : PHYSICAL EXAM  Constitutional:  Appears well, no apparent distress Ability to communicate: Normal Respiratory: non-labored breathing Skin: no rash noted Head: normocephalic, atraumatic Neck: no visible thyroid enlargement Eyes: extraocular movements intact Neurologic: alert and oriented x3 Psychiatric: normal mood, affect, and behavior   Neck: Palpation of the cervical spine is as follows: left trapezial spasm, right trapezial spasm, left trapezial tenderness, right trapezial tenderness, left paracervical spasm, right paracervical spasm, left paracervical tenderness and right paracervical tenderness. Range of motion of the cervical spine is as follows: diminished range of motion in all planes Pain at extremes of rotation to right and rotation to left. Stiffness at extremes of rotation to right and rotation to left. Strength Testing for the cervical spine is as follows: Left Deltoid strength 5/5, Right Deltoid strength 5/5, Left Biceps 5/5, Right Biceps 5/5, Left Triceps 5/5, Right Triceps 5/5, Left Wrist Flexors 5/5, Right Wrist Flexors 5/5, Left Finger Abductors 5/5, Right Finger Abductors 5/5, Left Grasp 5/5 and Right Grasp 5/5 Neurological testing for the cervical spine is as follows: positive bilateral facet loading. light touch is intact throughout both upper extremities, normal deep tendon reflexes bilateral upper extremities, negative Spurling test and negative Cabello reflex    Assessment  Spondylosis of cervical region (M47.812) Cervicalgia (M54.2)   Plan: After discussing various treatment options with the patient including but not limited to oral medications, physical therapy, exercise modalities as well as interventional spinal injections, we have decided with the following plan:  MRI Review  I personally reviewed the MRI/CT scan images and agree with the radiologist's report.  The radiological findings were discussed with the patient.    The risks, benefits and alternatives of the proposed procedure were explained in detail with the patient.  The risks outlined include but are not limited to infection, bleeding, post dural puncture headache, nerve injury, a temporary increase in pain, failure to resolve symptoms, allergic reaction, symptom recurrence, and possible elevation of blood sugar.  All questions were answered to patient's satisfaction and he/she verbalized an understanding.  Follow up 1-2 weeks post injection for re-evaluation.  Continue home exercises, stretching, activity modification, physical therapy, and conservative care.

## 2023-08-07 NOTE — HISTORY OF PRESENT ILLNESS
[Neck] : neck [10] : 10 [9] : 9 [Radiating] : radiating [Constant] : constant [Work] : work [Rest] : rest [] : no [de-identified] : depends on activities

## 2023-08-18 ENCOUNTER — APPOINTMENT (OUTPATIENT)
Dept: PAIN MANAGEMENT | Facility: CLINIC | Age: 28
End: 2023-08-18
Payer: COMMERCIAL

## 2023-08-18 PROCEDURE — 64492 INJ PARAVERT F JNT C/T 3 LEV: CPT | Mod: 59,RT

## 2023-08-18 PROCEDURE — 64490 INJ PARAVERT F JNT C/T 1 LEV: CPT | Mod: RT

## 2023-08-18 PROCEDURE — J3490M: CUSTOM

## 2023-08-18 PROCEDURE — 64491 INJ PARAVERT F JNT C/T 2 LEV: CPT | Mod: 59,RT

## 2023-08-18 NOTE — PROCEDURE
[FreeTextEntry3] : Date of Service: 08/18/2023   Account: [id number]  Patient: XOCHITL BAUM   YOB: 1995  Age: 28 year   Surgeon: Dav Ho M.D.  Assistant: None.  Pre-Operative Diagnosis: Spondylosis of cervical region without myelopathy or radiculopathy  Post Operative Diagnosis:  Spondylosis of cervical region without myelopathy or radiculopathy  Procedure: Right ,C4,C5,C6, C7 Medial Branch  Block under fluoroscopic guidance.   Anesthesia: MAC   This procedure was carried out using fluoroscopic guidance.  The risks and benefits of the procedure were discussed extensively with the patient.  The consent of the patient was obtained and the following procedure was performed.  The patient was placed in the prone position.  The patient's neck was prepped and draped in a sterile fashion.  The ,C4,C5,C6, C7  vertebral levels were identified and the fluoroscope right obliqued to approximately 10 degrees to reveal the "waste" of the left articular pillars at the C4,C5,C6, C7  levels and these were marked.  The skin at these target points was then localized using 1 cc of 1% Lidocaine without epinephrine at each injection site.  A 25 Gauge 3  inch spinal needle was then introduced and advanced at these four levels to the above target points, and under lateral view the needle tip was advanced a few millimeters and confirmed at the middle of the classical "trapezius" structure on oss. After negative aspiration for heme and CSF, an injectate of 1cc 0.25% marcaine was injected at each of the four injection sites.  The needles were then removed and pressure was applied.  Anesthesia personnel were present throughout the procedure.  The patient was instructed to apply ice over the injection site for twenty minutes every two hours for the next 24 to 48 hours.  The patient was also instructed to contact me immediately if there were any problems.   Dav Ho M.D.

## 2023-09-15 NOTE — ED PROVIDER NOTE - OBJECTIVE STATEMENT
25M h/o seizures (supposed to be on trileptal 600mg bid, topiramate 50mg bid) p/w intermittent diplopia since yesterday morning. Had a GTC seizure in his sleep 4 days ago, witnessed by father. Patient has been non-compliant with anti-epileptics, started taking them again 3 days ago. Woke up yesterday morning with diplopia, went to [facility] and had CTH that he believes was negative. Diplopia again today. Admits to alcohol and marijuana use, last used 2 weeks ago. Denies fever, weakness, numbness, tingling, cough, CP, SOB, slurred speech.
2

## 2023-10-23 ENCOUNTER — APPOINTMENT (OUTPATIENT)
Dept: PAIN MANAGEMENT | Facility: CLINIC | Age: 28
End: 2023-10-23

## 2023-11-06 ENCOUNTER — APPOINTMENT (OUTPATIENT)
Dept: PAIN MANAGEMENT | Facility: CLINIC | Age: 28
End: 2023-11-06
Payer: COMMERCIAL

## 2023-11-06 VITALS — BODY MASS INDEX: 21.87 KG/M2 | WEIGHT: 165 LBS | HEIGHT: 73 IN

## 2023-11-06 DIAGNOSIS — M79.18 MYALGIA, OTHER SITE: ICD-10-CM

## 2023-11-06 DIAGNOSIS — M47.812 SPONDYLOSIS W/OUT MYELOPATHY OR RADICULOPATHY, CERVICAL REGION: ICD-10-CM

## 2023-11-06 PROCEDURE — 20552 NJX 1/MLT TRIGGER POINT 1/2: CPT

## 2023-11-06 PROCEDURE — 96372 THER/PROPH/DIAG INJ SC/IM: CPT

## 2023-11-06 PROCEDURE — J3490M: CUSTOM

## 2023-11-06 PROCEDURE — 99214 OFFICE O/P EST MOD 30 MIN: CPT | Mod: 25

## 2023-11-10 ENCOUNTER — RESULT REVIEW (OUTPATIENT)
Age: 28
End: 2023-11-10

## 2023-11-20 ENCOUNTER — APPOINTMENT (OUTPATIENT)
Dept: PAIN MANAGEMENT | Facility: CLINIC | Age: 28
End: 2023-11-20
Payer: COMMERCIAL

## 2023-11-20 VITALS — HEIGHT: 73 IN | BODY MASS INDEX: 21.87 KG/M2 | WEIGHT: 165 LBS

## 2023-11-20 DIAGNOSIS — R07.9 CHEST PAIN, UNSPECIFIED: ICD-10-CM

## 2023-11-20 PROCEDURE — 99213 OFFICE O/P EST LOW 20 MIN: CPT
